# Patient Record
Sex: FEMALE | URBAN - METROPOLITAN AREA
[De-identification: names, ages, dates, MRNs, and addresses within clinical notes are randomized per-mention and may not be internally consistent; named-entity substitution may affect disease eponyms.]

---

## 2024-06-27 ENCOUNTER — TELEPHONE (OUTPATIENT)
Dept: NEUROLOGY | Facility: CLINIC | Age: 64
End: 2024-06-27
Payer: MEDICARE

## 2024-06-27 NOTE — TELEPHONE ENCOUNTER
----- Message from Keyana Whitman sent at 6/27/2024  3:30 PM CDT -----  Contact: SELF   .Type: Patient Call Back        Who called:      PATIENT   What is the request in detail:    PATIENT CALLED IN CONCERNING REFERRAL FROM Dr. Ryan Powell  SHE WAS REACHED OUT TO BY DR MATTHESW NURSE BUT SHE WOULD LIKE TO BE SEEN BY DR MARCH   Can the clinic reply by [facility]?           Would the patient rather a call back or a response via [facility]?      CALL   Best call back number:  [phone]
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
## 2024-06-27 NOTE — TELEPHONE ENCOUNTER
----- Message from Lashawn Hong sent at 6/27/2024  9:48 AM CDT -----  Regarding: Referral  Good morning,    Dr. Ryan Powell would like to refer the following patient to the Neurology department. The patients diagnosis is F07.81 Post concussive syndrome. I have scanned the patients referral and records into .     Thanks,    Lashawn Meeks